# Patient Record
Sex: MALE | Race: WHITE | Employment: FULL TIME | URBAN - METROPOLITAN AREA
[De-identification: names, ages, dates, MRNs, and addresses within clinical notes are randomized per-mention and may not be internally consistent; named-entity substitution may affect disease eponyms.]

---

## 2019-04-29 ENCOUNTER — HOSPITAL ENCOUNTER (OUTPATIENT)
Dept: MRI IMAGING | Age: 24
Discharge: HOME | End: 2019-04-29
Attending: EMERGENCY MEDICINE

## 2019-04-29 DIAGNOSIS — S83.92XA SPRAIN OF LEFT KNEE: ICD-10-CM

## 2019-04-30 ENCOUNTER — HOSPITAL ENCOUNTER (OUTPATIENT)
Dept: MRI IMAGING | Age: 24
Discharge: HOME | End: 2019-04-30
Attending: EMERGENCY MEDICINE
Payer: OTHER MISCELLANEOUS

## 2019-04-30 PROCEDURE — 73721 MRI JNT OF LWR EXTRE W/O DYE: CPT

## 2019-07-15 ENCOUNTER — HOSPITAL ENCOUNTER (OUTPATIENT)
Dept: PHYSICAL THERAPY | Age: 24
Discharge: HOME | End: 2019-07-15
Payer: OTHER MISCELLANEOUS

## 2019-07-15 NOTE — PROGRESS NOTES
PT INITIAL EVALUATION NOTE 2-15    Patient Name: Vaughn Preston  Date:7/15/2019  : 1995  [x]  Patient  Verified  Payor: Jostin Breen [id number] / Plan: [address] / Product Type: Workers Comp /    In [employer] time:512  Total Treatment Time (min): 59  Visit #: 1     Treatment Area: Left knee pain [M25.562]    SUBJECTIVE  Pain Level (0-10 scale): 6-7  Any medication changes, allergies to medications, adverse drug reactions, diagnosis change, or new procedure performed?: [] No    [x] Yes (see summary sheet for update)  Subjective:     19 pt reports that he injured his left knee when he stepped onto a mat that had water under it. The mat shifted and he reports hyperextending his knee. He felt a pop and had immediate pain along the joint line bilaterally and behind the knee cap itself. He went to [employer] [facility] and was sent to see Dr. Chicho Li who placed him in an immobilizer for 1-2 weeks and then transitioned him to a knee brace and referred him to Dr. Joss Hooks. Dr. Joss Hooks gave him 1 cortisone injection that seemed to help after 5 days. He reports that the relief last ~2 weeks. MRI performed 19 shows a chondral defect and roughly 1x3 cm lesion on the distal aspect of the femoral diaphysis. He complains of pain with squatting, busy days at work (9 hour shifts without breaks), up stairs,  He does report intermittent feeling of knee buckling or giving out,  He enjoys fishing and periodically has trouble if the waves are high.             OBJECTIVE/EXAMINATION  Posture:  forward head rounded shoulders  Other Observations:  Hyper mobility of the left patella  Gait and Functional Mobility:  Antalgic on the left  Palpation: tenderness along the medial and lateral joint line as well over the patella tendon      Right Knee AROM 0-140   Left Knee AROM 0-85    Flexibility: decreased hamstring    LOWER QUARTER   MUSCLE STRENGTH  KEY       R  L  0 - No Contraction  Knee ext  4+  4-  1 - Trace            flex  4+  4-P!  2 - Poor   Hip ext   NT  NT  3 - Fair          flex   4+  4-  4 - Good         abd  4-  3+  5 - Normal         add  NT  NT      Ankle DF  4+  4+                PF  NT  NT                INV  NT  NT                EV  NT  NT    Neurological: Reflexes / Sensations: grossly intact  Special Tests: Trendelenberg: NT    Stork: NT      Black: +     Andrés: NT                 H.S. SLR: +    Piriformis Ext: NT      Long Sit: NT     Hip Scour: NT      Knee Varus/Valgus Stress: -  Knee Lachmans: -      Other:  McMurrays + on the left    Modality rationale: decrease inflammation and decrease pain to improve the patients ability to complete all activity   Min Type Additional Details                       10 [x]  Ice     []  Heat  []  Ice massage Position:long sit  Location:left knee       25 min Therapeutic Exercise:  [x] See flow sheet :   Rationale: increase ROM, increase strength, improve coordination, improve balance and increase proprioception to improve the patients ability to complete all activity    Other Objective/Functional Measures:FOTO 35    Pain Level (0-10 scale) post treatment: 4-5      ASSESSMENT:      [x]  See Plan of Tam, PT 7/15/2019

## 2019-07-16 NOTE — PROGRESS NOTES
[address] [place] [address] [id number] ([place]), [address] [facility]  [place],  [address]  Phone: [phone] Fax: [phone]    Plan of Care/Statement of Necessity for Physical Therapy Services  2-15    Patient name: Bhavin Buchanan  : 1995  Provider#: [id number]  Referral source: Tejinder Kaiser MD      Medical/Treatment Diagnosis: Left knee pain [M25.562]     Prior Hospitalization: see medical history     Comorbidities: anxiety/panic disorder, back pain, BMI>30, depression, other disorders  Prior Level of Function: 20 minutes of exercise 1-2 times a week  Medications: Verified on Patient Summary List    Start of Care: 7/15/19      Onset Date: 19       The Plan of Care and following information is based on the information from the initial evaluation. Assessment/ key information: Pt is a 23 yo male who is in today with a script for a left knee sprain. The MRI results show chondral defects of the distal femur and medial ridge of the patella. He has pain along the joint line bilaterally and as he describes on the back of the patella. There is hypermobility of the left patella medially and laterally. Ligamentous testing in the clinic was negative throughout. There was a audible pop with meniscus testing. He has AROM 0-85 degrees in the left knee. He has additional available range but complains of pain beyond 85 degrees. Strength is decreased in the quads, hip flexors, hip external rotators and hip abductors on the left. Pt is a good candidate for PT and will benefit from skilled services to address these deficits and work toward the goals listed below.         Evaluation Complexity History HIGH Complexity :3+ comorbidities / personal factors will impact the outcome/ POC ; Examination HIGH Complexity : 4+ Standardized tests and measures addressing body structure, function, activity limitation and / or participation in recreation  ;Presentation MEDIUM Complexity : Evolving with changing characteristics  ; Clinical Decision Making MEDIUM Complexity : FOTO score of 26-74  Overall Complexity Rating: MEDIUM    Problem List: pain affecting function, decrease ROM, decrease strength, impaired gait/ balance, decrease ADL/ functional abilitiies, decrease activity tolerance and decrease flexibility/ joint mobility   Treatment Plan may include any combination of the following: Therapeutic exercise, Therapeutic activities, Neuromuscular re-education, Physical agent/modality, Gait/balance training, Manual therapy, Patient education, Self Care training, Functional mobility training, Home safety training and Stair training  Patient / Family readiness to learn indicated by: asking questions, trying to perform skills and interest  Persons(s) to be included in education: patient (P)  Barriers to Learning/Limitations: None  Patient Goal (s): less pain stronger knee  Patient Self Reported Health Status: good  Rehabilitation Potential: good    Short Term Goals: To be accomplished in 4-6 treatments:   Pt will be I with HE{P   Pt will increase ROM to normalize his gait to complete all activity   Pt will complain of pain 2-3/10 with all activity  Long Term Goals: To be accomplished in 8-10 treatments:   Pt will complain of pain 0-1/10 with all activity   Pt will increase strength to stabilize the knee with all activity to assist him in completing all work duties without increased symptoms   Pt will be able to perform squats with proper form to complete all activity household chores   Pt will be able to walk 2-3 hours without increased symptoms while hunting  Frequency / Duration: Patient to be seen 2 times per week for 8-10 treatments.     Patient/ Caregiver education and instruction: self care, activity modification and exercises    [x]  Plan of care has been reviewed with TIMOTHY Noe, PT 7/15/2019     ________________________________________________________________________    I certify that the above Therapy Services are being furnished while the patient is under my care. I agree with the treatment plan and certify that this therapy is necessary.     [facility] Signature:____________________  Date:____________Time: _________

## 2019-07-19 ENCOUNTER — HOSPITAL ENCOUNTER (OUTPATIENT)
Dept: PHYSICAL THERAPY | Age: 24
Discharge: HOME | End: 2019-07-19
Payer: OTHER MISCELLANEOUS

## 2019-07-19 PROCEDURE — 97016 VASOPNEUMATIC DEVICE THERAPY: CPT | Performed by: PHYSICAL THERAPIST

## 2019-07-19 PROCEDURE — 97110 THERAPEUTIC EXERCISES: CPT | Performed by: PHYSICAL THERAPIST

## 2019-07-19 NOTE — PROGRESS NOTES
PT DAILY TREATMENT NOTE 2-15    Patient Name: Sergei Mathis  Date:2019  : 1995  [x]  Patient  Verified  Payor: Jostin Lund Daisha Pollocknilo [id number] / Plan: [address] / Product Type: Workers Comp /    In [place] time:839  Total Treatment Time (min): 59  Visit #:  2    Treatment Area: Left knee pain [M25.562]    SUBJECTIVE  Pain Level (0-10 scale): 5  Any medication changes, allergies to medications, adverse drug reactions, diagnosis change, or new procedure performed?: [x] No    [] Yes (see summary sheet for update)  Subjective functional status/changes:   [] No changes reported  Pt reports that he is feeling better with the HEP    OBJECTIVE    Modality rationale: decrease inflammation and decrease pain to improve the patients ability to complete all activity   Min Type Additional Details                                   15 [x]  Vasopneumatic Device Pressure:       [] lo [x] med [] hi   Temperature: 34       49 min Therapeutic Exercise:  [x] See flow sheet :   Rationale: increase ROM, increase strength, improve coordination, improve balance and increase proprioception to improve the patients ability to complete all activity    Pain Level (0-10 scale) post treatment: 0    ASSESSMENT/Changes in Function:   Pt tolerated all there-ex well. He is feeling looser following treatment but the pain level is elevated. Pain is diminished with icing. Attempted to tape the knee which he felt was of benefit but it did not hold very well secondary to hair around the knee and on the leg. He states that he will try it and if he feels that it was helpful he will shave the knee before next visit. His tolerance for activity was better today however.   Will continue to progress as  able  Patient will continue to benefit from skilled PT services to modify and progress therapeutic interventions, address functional mobility deficits, address ROM deficits, address strength deficits, analyze and address soft tissue restrictions, analyze and cue movement patterns, analyze and modify body mechanics/ergonomics, assess and modify postural abnormalities and address imbalance/dizziness to attain remaining goals.      []  See Plan of Care  []  See progress note/recertification  []  See Discharge Summary         PLAN  [x]  Upgrade activities as tolerated     [x]  Continue plan of care  [x]  Update interventions per flow sheet       []  Discharge due to:_  []  Other:_      Coty Thacker, PT 7/19/2019

## 2019-07-22 ENCOUNTER — APPOINTMENT (OUTPATIENT)
Dept: PHYSICAL THERAPY | Age: 24
End: 2019-07-22
Payer: OTHER MISCELLANEOUS

## 2019-07-24 ENCOUNTER — HOSPITAL ENCOUNTER (OUTPATIENT)
Dept: PHYSICAL THERAPY | Age: 24
Discharge: HOME | End: 2019-07-24
Payer: OTHER MISCELLANEOUS

## 2019-07-24 PROCEDURE — 97110 THERAPEUTIC EXERCISES: CPT | Performed by: PHYSICAL THERAPIST

## 2019-07-24 PROCEDURE — 97016 VASOPNEUMATIC DEVICE THERAPY: CPT | Performed by: PHYSICAL THERAPIST

## 2019-07-24 NOTE — PROGRESS NOTES
PT DAILY TREATMENT NOTE 2-15    Patient Name: Aditya Tay  Date:2019  : 1995  [x]  Patient  Verified  Payor: /    In time:356  Out time:458  Total Treatment Time (min): 58  Visit #:  3    Treatment Area: Left knee pain [M25.562]    SUBJECTIVE  Pain Level (0-10 scale): 5  Any medication changes, allergies to medications, adverse drug reactions, diagnosis change, or new procedure performed?: [x] No    [] Yes (see summary sheet for update)  Subjective functional status/changes:   [] No changes reported  Pt reports that he is feeling better and stronger but the pain is still present. OBJECTIVE    Modality rationale: decrease inflammation and decrease pain to improve the patients ability to complete all activity   Min Type Additional Details                                 15   [x]  Vasopneumatic Device Pressure:       [] lo [x] med [] hi   Temperature: 34     47 min Therapeutic Exercise:  [x] See flow sheet :   Rationale: increase ROM, increase strength, improve coordination, improve balance and increase proprioception to improve the patients ability to complete all activity    Pain Level (0-10 scale) post treatment: 0    ASSESSMENT/Changes in Function:   Pt is able to complete all activity  With minor complaints of pain with squatting and loading the left leg. He does report improvement with activity and following ice. However, he begins sessions with pain 5-6/10.   Will continue to strengthen the hips and quad to stabilize the knee and improve his ability to complete all activity  Patient will continue to benefit from skilled PT services to modify and progress therapeutic interventions, address functional mobility deficits, address ROM deficits, address strength deficits, analyze and address soft tissue restrictions, analyze and cue movement patterns, analyze and modify body mechanics/ergonomics, assess and modify postural abnormalities and address imbalance/dizziness to attain remaining goals.     []  See Plan of Care  []  See progress note/recertification  []  See Discharge Summary         PLAN  [x]  Upgrade activities as tolerated     [x]  Continue plan of care  [x]  Update interventions per flow sheet       []  Discharge due to:_  []  Other:_      Merlin Miller, PT 7/24/2019

## 2019-07-29 ENCOUNTER — HOSPITAL ENCOUNTER (OUTPATIENT)
Dept: PHYSICAL THERAPY | Age: 24
Discharge: HOME | End: 2019-07-29
Payer: OTHER MISCELLANEOUS

## 2019-07-29 PROCEDURE — 97016 VASOPNEUMATIC DEVICE THERAPY: CPT

## 2019-07-29 PROCEDURE — 97110 THERAPEUTIC EXERCISES: CPT

## 2019-07-29 NOTE — PROGRESS NOTES
PT DAILY TREATMENT NOTE 2-15    Patient Name: Aditya Tay  Date:2019  : 1995  [x]  Patient  Verified  Payor: /    In time:130  Out time:255  Total Treatment Time (min): 80  Visit #:  4    Treatment Area: Left knee pain [M25.562]    SUBJECTIVE  Pain Level (0-10 scale): 0.5/10  Any medication changes, allergies to medications, adverse drug reactions, diagnosis change, or new procedure performed?: [x] No    [] Yes (see summary sheet for update)  Subjective functional status/changes:   [] No changes reported  Patient reports stairs and being on his feet for long periods of time on his feet, or walking up and down stairs. OBJECTIVE    Modality rationale: decrease inflammation and decrease pain to improve the patients ability to perform ADLS and reduce pain levels.     Min Type Additional Details       [] Estim: []Att   []Unatt    []TENS instruct                  []IFC  []Premod   []NMES                     []Other:  []w/US   []w/ice   []w/heat  Position:  Location:       []  Traction: [] Cervical       []Lumbar                       [] Prone          []Supine                       []Intermittent   []Continuous Lbs:  [] before manual  [] after manual  []w/heat    []  Ultrasound: []Continuous   [] Pulsed                       at: []1MHz   []3MHz Location:  W/cm2:    [] Paraffin         Location:   []w/heat    []  Ice     []  Heat  []  Ice massage Position:  Location:    []  Laser  []  Other: Position:  Location:   15   [x]  Vasopneumatic Device Pressure:       [] lo [x] med [] hi   Temperature: 34     [x] Skin assessment post-treatment:  [x]intact []redness- no adverse reaction    []redness  adverse reaction:         70 min Therapeutic Exercise:  [x] See flow sheet :   Rationale: increase ROM and increase strength to improve the patients ability to perform ADLs and reduce pain levels    With   [] TE   [] TA   [] neuro   [] other: Patient Education: [x] Review HEP    [] Progressed/Changed HEP based on:   [] positioning   [] body mechanics   [] transfers   [] heat/ice application    [] other:      Other Objective/Functional Measures: none noted     Pain Level (0-10 scale) post treatment: 0/10    ASSESSMENT/Changes in Function:   Patient will require multiple VCs in order to properly perform therex. Continues to experience discomfort with sit to stands. Able to perform monster walks without any pain with fully extended knees. Will continue to progress as tolerated. Patient will continue to benefit from skilled PT services to modify and progress therapeutic interventions, address functional mobility deficits, address ROM deficits, address strength deficits, analyze and address soft tissue restrictions, analyze and cue movement patterns, analyze and modify body mechanics/ergonomics and assess and modify postural abnormalities to attain remaining goals. [x]  See Plan of Care  []  See progress note/recertification  []  See Discharge Summary         Progress towards goals / Updated goals:  Patient is progressing towards goals.      PLAN  [x]  Upgrade activities as tolerated     [x]  Continue plan of care  [x]  Update interventions per flow sheet       []  Discharge due to:_  []  Other:_      Zeinab Turner 7/29/2019

## 2019-08-01 ENCOUNTER — HOSPITAL ENCOUNTER (OUTPATIENT)
Dept: PHYSICAL THERAPY | Age: 24
Discharge: HOME | End: 2019-08-01
Payer: OTHER MISCELLANEOUS

## 2019-08-01 PROCEDURE — 97110 THERAPEUTIC EXERCISES: CPT

## 2019-08-01 PROCEDURE — 97016 VASOPNEUMATIC DEVICE THERAPY: CPT

## 2019-08-01 NOTE — PROGRESS NOTES
PT DAILY TREATMENT NOTE 2-15    Patient Name: Bhavin Buchanan  Date:2019  : 1995  [x]  Patient  Verified  Payor: /    In time:401  Out time:504  Total Treatment Time (min): 61  Visit #:  5    Treatment Area: Left knee pain [M25.562]    SUBJECTIVE  Pain Level (0-10 scale): 4/10  Any medication changes, allergies to medications, adverse drug reactions, diagnosis change, or new procedure performed?: [x] No    [] Yes (see summary sheet for update)  Subjective functional status/changes:   [] No changes reported  Patient reports it remains a constant four unless he is walking up stairs     OBJECTIVE    Modality rationale: decrease inflammation and decrease pain to improve the patients ability to perform ADLs and reduce pain levels   Min Type Additional Details       [] Estim: []Att   []Unatt    []TENS instruct                  []IFC  []Premod   []NMES                     []Other:  []w/US   []w/ice   []w/heat  Position:  Location:       []  Traction: [] Cervical       []Lumbar                       [] Prone          []Supine                       []Intermittent   []Continuous Lbs:  [] before manual  [] after manual  []w/heat    []  Ultrasound: []Continuous   [] Pulsed                       at: []1MHz   []3MHz Location:  W/cm2:    [] Paraffin         Location:   []w/heat    []  Ice     []  Heat  []  Ice massage Position:  Location:    []  Laser  []  Other: Position:  Location:   15   [x]  Vasopneumatic Device Pressure:       [] lo [x] med [] hi   Temperature: 34     [x] Skin assessment post-treatment:  [x]intact []redness- no adverse reaction    []redness  adverse reaction:         47 min Therapeutic Exercise:  [x] See flow sheet :   Rationale: increase ROM and increase strength to improve the patients ability to perform ADLs and reduce pain levels          With   [] TE   [] TA   [] neuro   [] other: Patient Education: [x] Review HEP    [] Progressed/Changed HEP based on:   [] positioning   [] body mechanics   [] transfers   [] heat/ice application    [] other:      Other Objective/Functional Measures: none noted     Pain Level (0-10 scale) post treatment: 5/10    ASSESSMENT/Changes in Function:   Patient demonstrates improved strength in the hip stabilizers. Will experience difficulty when performing single leg squats on the TG. Will continue to progress as tolerated. Patient will continue to benefit from skilled PT services to modify and progress therapeutic interventions, address functional mobility deficits, address ROM deficits, address strength deficits, analyze and address soft tissue restrictions, analyze and cue movement patterns, analyze and modify body mechanics/ergonomics and assess and modify postural abnormalities to attain remaining goals. [x]  See Plan of Care  []  See progress note/recertification  []  See Discharge Summary         Progress towards goals / Updated goals:  Patient is progressing towards goals.      PLAN  [x]  Upgrade activities as tolerated     [x]  Continue plan of care  [x]  Update interventions per flow sheet       []  Discharge due to:_  []  Other:_      Bernice Lim 8/1/2019

## 2019-08-05 ENCOUNTER — HOSPITAL ENCOUNTER (OUTPATIENT)
Dept: PHYSICAL THERAPY | Age: 24
Discharge: HOME | End: 2019-08-05
Payer: OTHER MISCELLANEOUS

## 2019-08-05 PROCEDURE — 97110 THERAPEUTIC EXERCISES: CPT | Performed by: PHYSICAL THERAPIST

## 2019-08-05 PROCEDURE — 97016 VASOPNEUMATIC DEVICE THERAPY: CPT | Performed by: PHYSICAL THERAPIST

## 2019-08-07 ENCOUNTER — HOSPITAL ENCOUNTER (OUTPATIENT)
Dept: PHYSICAL THERAPY | Age: 24
Discharge: HOME | End: 2019-08-07
Payer: OTHER MISCELLANEOUS

## 2019-08-07 PROCEDURE — 97016 VASOPNEUMATIC DEVICE THERAPY: CPT | Performed by: PHYSICAL THERAPIST

## 2019-08-07 PROCEDURE — 97110 THERAPEUTIC EXERCISES: CPT | Performed by: PHYSICAL THERAPIST

## 2019-08-07 NOTE — PROGRESS NOTES
[employer] [facility]  [address] ([facility]), [address] [facility] [place]  Phone: [phone] Fax: [phone]    Progress Note    Name: Fern Guerra   : 1995   MD: Smitha Lopez MD       Treatment Diagnosis: Left knee pain [M25.562]  Start of Care: 7/15/19    Visits from Start of Care: 6  Missed Visits: 0    Summary of Care:Pt has completed 6 vistis of therapy that have been focused on restoring ROM and strength in his left knee. Pt has progressed well so far and AROM is nearly full. He still complains with loading through the left leg. He has greatest difficulty with climbing stairs reciprocally when he is standing on the left leg. He also gets pain with squatting and tries to shift weight to the right to complete the exercise. Strength is returning to the quad and he is working to improve hip strength to better stabilize the left leg, but he is struggling to improve eccentric strength secondary to discomfort. He remains a good candidate for PT and will benefit from ongoing skilled care to further address deficits and work toward established goals    Assessment / Recommendations:     Goal:  Short Term Goals: To be accomplished in 4-6 treatments:               Pt will be I with HEP  Progressing Toward               Pt will increase ROM to normalize his gait to complete all activity Progressing Toward               Pt will complain of pain 2-3/10 with all activity Met Intermittently  Long Term Goals:  To be accomplished in 8-10 treatments:               Pt will complain of pain 0-1/10 with all activity Not Met               Pt will increase strength to stabilize the knee with all activity to assist him in completing all work duties without increased symptoms Progressing Toward               Pt will be able to perform squats with proper form to complete all activity household chores Progressing Toward               Pt will be able to walk 2-3 hours without increased symptoms while hunting Not Met    Other: Cont 2 x wk x 8-10 visits      Sara Dowd, PT 8/7/2019     ________________________________________________________________________  NOTE TO PHYSICIAN:  Please complete the following and fax to: [facility]: [phone]  . Retain this original for your records. If you are unable to process this request in 24 hours, please contact our office.        ____ I have read the above report and request that my patient continue therapy with the following changes/special instructions:  ____ I have read the above report and request that my patient be discharged from therapy    Physician's Signature:_________________ Date:___________Time:__________

## 2019-08-07 NOTE — PROGRESS NOTES
PT DAILY TREATMENT NOTE 2-15    Patient Name: Aaliyah Stein  Date:2019  : 1995  [x]  Patient  Verified  Payor: Jostin Lund Daisha Breen [id number] / Plan: [address] / Product Type: Workers Comp /    In time:300  Out time:405  Total Treatment Time (min): 65  Visit #:  6    Treatment Area: Left knee pain [M25.562]    SUBJECTIVE  Pain Level (0-10 scale): 4  Any medication changes, allergies to medications, adverse drug reactions, diagnosis change, or new procedure performed?: [x] No    [] Yes (see summary sheet for update)  Subjective functional status/changes:   [] No changes reported  Pt reports that the pain is about the same. He feels better but still has discomfort with loading the leg    OBJECTIVE    Modality rationale: decrease inflammation and decrease pain to improve the patients ability to complete all activity   Min Type Additional Details                                   15 [x]  Vasopneumatic Device Pressure:       [] lo [x] med [] hi   Temperature: 34     [x] Skin assessment post-treatment:  [x]intact []redness- no adverse reaction    []redness  adverse reaction:     50 min Therapeutic Exercise:  [x] See flow sheet :   Rationale: increase ROM, increase strength, improve coordination, improve balance and increase proprioception to improve the patients ability to complete all activity    Pain Level (0-10 scale) post treatment: 2    ASSESSMENT/Changes in Function:   Pt is feeling better after the game ready but he was having increased symptoms with attempting stairs and squatting today. He states that he can do most activity but it just hurts when he loads the leg. He attempts to off weight the left leg when squatting and is unable to perform eccentrics without increased symptoms.   He will return to the MD on Thursday this week and will need to continue therapy to further address his deficits  Patient will continue to benefit from skilled PT services to modify and progress therapeutic interventions, address functional mobility deficits, address ROM deficits, address strength deficits, analyze and address soft tissue restrictions, analyze and cue movement patterns, analyze and modify body mechanics/ergonomics, assess and modify postural abnormalities and address imbalance/dizziness to attain remaining goals.      []  See Plan of Care  []  See progress note/recertification  []  See Discharge Summary         PLAN  [x]  Upgrade activities as tolerated     [x]  Continue plan of care  [x]  Update interventions per flow sheet       []  Discharge due to:_  []  Other:_      Rojelio Cameron, PT 8/7/2019

## 2019-08-14 ENCOUNTER — HOSPITAL ENCOUNTER (OUTPATIENT)
Dept: PHYSICAL THERAPY | Age: 24
Discharge: HOME | End: 2019-08-14
Payer: OTHER MISCELLANEOUS

## 2019-08-14 PROCEDURE — 97016 VASOPNEUMATIC DEVICE THERAPY: CPT | Performed by: PHYSICAL THERAPIST

## 2019-08-14 PROCEDURE — 97110 THERAPEUTIC EXERCISES: CPT | Performed by: PHYSICAL THERAPIST

## 2019-08-16 ENCOUNTER — HOSPITAL ENCOUNTER (OUTPATIENT)
Dept: PHYSICAL THERAPY | Age: 24
Discharge: HOME | End: 2019-08-16
Payer: OTHER MISCELLANEOUS

## 2019-08-16 PROCEDURE — 97016 VASOPNEUMATIC DEVICE THERAPY: CPT | Performed by: PHYSICAL THERAPIST

## 2019-08-16 PROCEDURE — 97110 THERAPEUTIC EXERCISES: CPT | Performed by: PHYSICAL THERAPIST

## 2019-08-19 ENCOUNTER — HOSPITAL ENCOUNTER (OUTPATIENT)
Dept: PHYSICAL THERAPY | Age: 24
Discharge: HOME | End: 2019-08-19
Payer: OTHER MISCELLANEOUS

## 2019-08-19 PROCEDURE — 97016 VASOPNEUMATIC DEVICE THERAPY: CPT | Performed by: PHYSICAL THERAPIST

## 2019-08-19 PROCEDURE — 97110 THERAPEUTIC EXERCISES: CPT | Performed by: PHYSICAL THERAPIST

## 2019-08-21 ENCOUNTER — HOSPITAL ENCOUNTER (OUTPATIENT)
Dept: PHYSICAL THERAPY | Age: 24
Discharge: HOME | End: 2019-08-21
Payer: OTHER MISCELLANEOUS

## 2019-08-21 PROCEDURE — 97110 THERAPEUTIC EXERCISES: CPT | Performed by: PHYSICAL THERAPIST

## 2019-08-21 PROCEDURE — 97016 VASOPNEUMATIC DEVICE THERAPY: CPT | Performed by: PHYSICAL THERAPIST

## 2019-08-27 NOTE — PROGRESS NOTES
PT DAILY TREATMENT NOTE 2-15    Patient Name: Adelina Henderson  Date:2019  : 1995  [x]  Patient  Verified  Payor: Jostin Danielleedenrashaun Sergejael [id number] / Plan: [address] / Product Type: Workers Comp /    In [employer] time:430  Total Treatment Time (min): 62  Visit #:  10    Treatment Area: Left knee pain [M25.562]    SUBJECTIVE  Pain Level (0-10 scale): 0  Any medication changes, allergies to medications, adverse drug reactions, diagnosis change, or new procedure performed?: [x] No    [] Yes (see summary sheet for update)  Subjective functional status/changes:   [] No changes reported  Pt reports that the shot is starting to wear off. He is starting to have some of his pain again    OBJECTIVE    Modality rationale: decrease inflammation and decrease pain to improve the patients ability to complete all activity   Min Type Additional Details                                   15 [x]  Vasopneumatic Device Pressure:       [] lo [x] med [] hi   Temperature:        42 min Therapeutic Exercise:  [x] See flow sheet :   Rationale: increase ROM, increase strength, improve coordination, improve balance and increase proprioception to improve the patients ability to complete all activity    Pain Level (0-10 scale) post treatment: 0    ASSESSMENT/Changes in Function:   Pt is able to complete all activity but is starting to have complaints of some minor discomfort during there-ex. Patient will continue to benefit from skilled PT services to modify and progress therapeutic interventions, address functional mobility deficits, address ROM deficits, address strength deficits, analyze and address soft tissue restrictions, analyze and cue movement patterns, analyze and modify body mechanics/ergonomics, assess and modify postural abnormalities and address imbalance/dizziness to attain remaining goals.      []  See Plan of Care  []  See progress note/recertification  []  See Discharge Summary PLAN  [x]  Upgrade activities as tolerated     [x]  Continue plan of care  [x]  Update interventions per flow sheet       []  Discharge due to:_  []  Other:_      Kyle Bear, PT 8/27/2019

## 2019-08-27 NOTE — PROGRESS NOTES
PT DAILY TREATMENT NOTE 2-15    Patient Name: Vonda Ruiz  Date:2019  : 1995  [x]  Patient  Verified  Payor: Jostin Lund Daisha Breen [id number] / Plan: [address] / Product Type: Workers Comp /    In [employer] time:818  Total Treatment Time (min): 74  Visit #:  9    Treatment Area: Left knee pain [M25.562]    SUBJECTIVE  Pain Level (0-10 scale): 0  Any medication changes, allergies to medications, adverse drug reactions, diagnosis change, or new procedure performed?: [x] No    [] Yes (see summary sheet for update)  Subjective functional status/changes:   [] No changes reported  Pt continues to present with no pain following injection. OBJECTIVE    Modality rationale: decrease inflammation and decrease pain to improve the patients ability to complete all activity   Min Type Additional Details                                   15 [x]  Vasopneumatic Device Pressure:       [] lo [x] med [] hi   Temperature: 34     59 min Therapeutic Exercise:  [x] See flow sheet :   Rationale: increase ROM, increase strength, improve coordination, improve balance and increase proprioception to improve the patients ability to complete all activity    Pain Level (0-10 scale) post treatment: 0    ASSESSMENT/Changes in Function:   PT is doing well and able to complete all activity without increased symptoms. He is demonstrating improved eccentric quad control and able to perform deep squat activity without increased weight shifting   Patient will continue to benefit from skilled PT services to modify and progress therapeutic interventions, address functional mobility deficits, address ROM deficits, address strength deficits, analyze and address soft tissue restrictions, analyze and cue movement patterns, analyze and modify body mechanics/ergonomics, assess and modify postural abnormalities and address imbalance/dizziness to attain remaining goals.      []  See Plan of Care  []  See progress note/recertification  []  See Discharge Summary         PLAN  [x]  Upgrade activities as tolerated     [x]  Continue plan of care  [x]  Update interventions per flow sheet       []  Discharge due to:_  []  Other:_      Pratibha Beltran, PT 8/27/2019

## 2019-08-27 NOTE — PROGRESS NOTES
PT DAILY TREATMENT NOTE 2-15    Patient Name: Kathleen Musa  Date:2019  : 1995  [x]  Patient  Verified  Payor: Jostinshalonda Leyvahoney Daisha Breen [id number] / Plan: [address] / Product Type: Workers Comp /    In time:400  Out time:500  Total Treatment Time (min): 60  Visit #:  11    Treatment Area: Left knee pain [M25.562]    SUBJECTIVE  Pain Level (0-10 scale): 0  Any medication changes, allergies to medications, adverse drug reactions, diagnosis change, or new procedure performed?: [x] No    [] Yes (see summary sheet for update)  Subjective functional status/changes:   [] No changes reported  Pt continues to report a progression of the discomfort with activity    OBJECTIVE    Modality rationale: decrease inflammation and decrease pain to improve the patients ability to complete all activity   Min Type Additional Details                                   15 [x]  Vasopneumatic Device Pressure:       [] lo [x] med [] hi   Temperature: 34       45 min Therapeutic Exercise:  [x] See flow sheet :   Rationale: increase ROM, increase strength, improve coordination, improve balance and increase proprioception to improve the patients ability to complete all activity    Pain Level (0-10 scale) post treatment: 0    ASSESSMENT/Changes in Function:   Pt continues to complain of pain today and has difficulty completing leg press activity as well as eccentric quad exercises. He reports that the relief he had with the injection initially has worn off  Patient will continue to benefit from skilled PT services to modify and progress therapeutic interventions, address functional mobility deficits, address ROM deficits, address strength deficits, analyze and address soft tissue restrictions, analyze and cue movement patterns, analyze and modify body mechanics/ergonomics, assess and modify postural abnormalities and address imbalance/dizziness to attain remaining goals.      []  See Plan of Care  []  See progress note/recertification  []  See Discharge Summary         PLAN  [x]  Upgrade activities as tolerated     [x]  Continue plan of care  [x]  Update interventions per flow sheet       []  Discharge due to:_  []  Other:_      Miriam Noe, PT 8/27/2019

## 2019-08-27 NOTE — PROGRESS NOTES
PT DAILY TREATMENT NOTE 2-15    Patient Name: Adelina Henderson  Date:2019  : 1995  [x]  Patient  Verified  Payor: Jostin Ashely Daisha Breen [id number] / Plan: [address] / Product Type: Workers Comp /    In [employer] time:452  Total Treatment Time (min): 79  Visit #:  8    Treatment Area: Left knee pain [M25.562]    SUBJECTIVE  Pain Level (0-10 scale): 0  Any medication changes, allergies to medications, adverse drug reactions, diagnosis change, or new procedure performed?: [x] No    [] Yes (see summary sheet for update)  Subjective functional status/changes:   [] No changes reported  Pt reports that he has no pain following the injection, but he is concerned that it is just masking the pain    OBJECTIVE    Modality rationale: decrease inflammation and decrease pain to improve the patients ability to complete all activity   Min Type Additional Details                                 15   [x]  Vasopneumatic Device Pressure:       [] lo [x] med [] hi   Temperature: 34     52 min Therapeutic Exercise:  [x] See flow sheet :   Rationale: increase ROM, increase strength, improve coordination, improve balance and increase proprioception to improve the patients ability to complete all activity    Pain Level (0-10 scale) post treatment: 0    ASSESSMENT/Changes in Function:   Pt is able to complete all activity to include squatting activity without increased symptoms. Initiated eccentric strengthening as well without increased symptoms. Will progress strength as able   Patient will continue to benefit from skilled PT services to modify and progress therapeutic interventions, address functional mobility deficits, address ROM deficits, address strength deficits, analyze and address soft tissue restrictions, analyze and cue movement patterns, analyze and modify body mechanics/ergonomics, assess and modify postural abnormalities and address imbalance/dizziness to attain remaining goals. []  See Plan of Care  []  See progress note/recertification  []  See Discharge Summary         PLAN  [x]  Upgrade activities as tolerated     [x]  Continue plan of care  [x]  Update interventions per flow sheet       []  Discharge due to:_  []  Other:_      Dequan Cardenas, PT 8/27/2019

## 2019-08-27 NOTE — PROGRESS NOTES
PT DAILY TREATMENT NOTE 2-15    Patient Name: Radha Keating  Date:2019  : 1995  [x]  Patient  Verified  Payor: Jostin Danielleedenrashaun Sergejael [id number] / Plan: [address] / Product Type: Workers Comp /    In time:300  Out time:405  Total Treatment Time (min): 65  Visit #:  6    Treatment Area: Left knee pain [M25.562]    SUBJECTIVE  Pain Level (0-10 scale): 4  Any medication changes, allergies to medications, adverse drug reactions, diagnosis change, or new procedure performed?: [x] No    [] Yes (see summary sheet for update)  Subjective functional status/changes:   [] No changes reported  Pt continues to have discomfort and difficulty progressing     OBJECTIVE    Modality rationale: decrease inflammation and decrease pain to improve the patients ability to complete all activity   Min Type Additional Details                                   15 [x]  Vasopneumatic Device Pressure:       [] lo [x] med [] hi   Temperature: 34     50 min Therapeutic Exercise:  [x] See flow sheet :   Rationale: increase ROM, increase strength, improve coordination, improve balance and increase proprioception to improve the patients ability to complete all activity    Pain Level (0-10 scale) post treatment: 2    ASSESSMENT/Changes in Function:   Pt is progressing slowly, and continues to have pain with squatting and loaded activities  Patient will continue to benefit from skilled PT services to modify and progress therapeutic interventions, address functional mobility deficits, address ROM deficits, address strength deficits, analyze and address soft tissue restrictions, analyze and cue movement patterns, analyze and modify body mechanics/ergonomics, assess and modify postural abnormalities and address imbalance/dizziness to attain remaining goals.      []  See Plan of Care  []  See progress note/recertification  []  See Discharge Summary         PLAN  [x]  Upgrade activities as tolerated     [x]  Continue plan of care  [x]  Update interventions per flow sheet       []  Discharge due to:_  []  Other:_      Vaughn Hernandez, PT 8/27/2019

## 2019-08-29 ENCOUNTER — HOSPITAL ENCOUNTER (OUTPATIENT)
Dept: PHYSICAL THERAPY | Age: 24
Discharge: HOME | End: 2019-08-29
Payer: OTHER MISCELLANEOUS

## 2019-08-29 PROCEDURE — 97016 VASOPNEUMATIC DEVICE THERAPY: CPT

## 2019-08-29 PROCEDURE — 97014 ELECTRIC STIMULATION THERAPY: CPT

## 2019-08-29 PROCEDURE — 97110 THERAPEUTIC EXERCISES: CPT

## 2019-08-29 NOTE — PROGRESS NOTES
PT DAILY TREATMENT NOTE 2-15    Patient Name: Aubree Diaz  Date:2019  : 1995  [x]  Patient  Verified  Payor: Jostin Leyvahoney Daisha Breen [id number] / Plan: [address] / Product Type: Workers Comp /    In time:300  Out time:410  Total Treatment Time (min): 70  Visit #:  12    Treatment Area: Left knee pain [M25.562]    SUBJECTIVE  Pain Level (0-10 scale): 1/10  Any medication changes, allergies to medications, adverse drug reactions, diagnosis change, or new procedure performed?: [x] No    [] Yes (see summary sheet for update)  Subjective functional status/changes:   [] No changes reported  Patient reports he has been feeling much better, however the TG continues to give him pain. OBJECTIVE    Modality rationale: decrease inflammation and decrease pain to improve the patients ability to perform ADLs and reduce pain levels. Min Type Additional Details       [] Estim: []Att   []Unatt    []TENS instruct                  []IFC  []Premod   []NMES                     []Other:  []w/US   []w/ice   []w/heat  Position:  Location:       []  Traction: [] Cervical       []Lumbar                       [] Prone          []Supine                       []Intermittent   []Continuous Lbs:  [] before manual  [] after manual  []w/heat    []  Ultrasound: []Continuous   [] Pulsed                       at: []1MHz   []3MHz Location:  W/cm2:    [] Paraffin         Location:   []w/heat    []  Ice     []  Heat  []  Ice massage Position:  Location:    []  Laser  []  Other: Position:  Location:      []  Vasopneumatic Device Pressure:       [] lo [] med [] hi   Temperature:      [x] Skin assessment post-treatment:  [x]intact []redness- no adverse reaction    []redness  adverse reaction:         55 min Therapeutic Exercise:  [x] See flow sheet :   Rationale: increase ROM and increase strength to improve the patients ability to perform ADLs and reduce pain levels.     With   [] TE   [] TA   [] neuro   [] other: Patient Education: [x] Review HEP    [] Progressed/Changed HEP based on:   [] positioning   [] body mechanics   [] transfers   [] heat/ice application    [] other:      Other Objective/Functional Measures: none noted     Pain Level (0-10 scale) post treatment: 0/10    ASSESSMENT/Changes in Function:   Patient demonstrated poor dynamic stability on uneven surfaces. Tolerated TG well with little to no discomfort. Will continue to progress as tolerated. Patient will continue to benefit from skilled PT services to modify and progress therapeutic interventions, address functional mobility deficits, address ROM deficits, address strength deficits, analyze and address soft tissue restrictions, analyze and cue movement patterns, analyze and modify body mechanics/ergonomics and assess and modify postural abnormalities to attain remaining goals. [x]  See Plan of Care  []  See progress note/recertification  []  See Discharge Summary         Progress towards goals / Updated goals:  Patient is progressing towards goals.     PLAN  [x]  Upgrade activities as tolerated     [x]  Continue plan of care  [x]  Update interventions per flow sheet       []  Discharge due to:_  []  Other:_      Brittnee Royal 8/29/2019

## 2019-09-05 ENCOUNTER — APPOINTMENT (OUTPATIENT)
Dept: PHYSICAL THERAPY | Age: 24
End: 2019-09-05

## 2019-12-04 NOTE — PROGRESS NOTES
[facility] Physical Therapy  [facility] ([facility]), [address] [place]  Phone: [phone] Fax: [phone]    Discharge Summary  2-15    Patient name: Daniel Epps  : 1995  Provider#: [id number]  Referral source: Maria L Contreras MD      Medical/Treatment Diagnosis: Left knee pain [M25.562]     Prior Hospitalization: see medical history     Comorbidities: See Plan of Care  Prior Level of Function:See Plan of Care  Medications: Verified on Patient Summary List    Start of Care: 7/15/19      Onset Date:19   Visits from Start of Care: 12     Missed Visits: 1  Reporting Period : 7/15/19 to 19      ASSESSMENT/SUMMARY OF CARE: Pt is discharged today, 2019, as they have stopped attending therapy. Final objective data and outcomes were unable to be obtained.           RECOMMENDATIONS:  [x]Discontinue therapy: []Patient has reached or is progressing toward set goals      [x]Patient is non-compliant or has abdicated      []Due to lack of appreciable progress towards set goals      []Other    Zachery Melton, PT 2019